# Patient Record
Sex: FEMALE | Race: WHITE
[De-identification: names, ages, dates, MRNs, and addresses within clinical notes are randomized per-mention and may not be internally consistent; named-entity substitution may affect disease eponyms.]

---

## 2018-05-13 ENCOUNTER — HOSPITAL ENCOUNTER (EMERGENCY)
Dept: HOSPITAL 11 - JP.ED | Age: 33
LOS: 1 days | Discharge: HOME | End: 2018-05-14
Payer: MEDICAID

## 2018-05-13 VITALS — DIASTOLIC BLOOD PRESSURE: 75 MMHG | SYSTOLIC BLOOD PRESSURE: 123 MMHG

## 2018-05-13 DIAGNOSIS — Z88.8: ICD-10-CM

## 2018-05-13 DIAGNOSIS — Z87.442: ICD-10-CM

## 2018-05-13 DIAGNOSIS — R10.9: Primary | ICD-10-CM

## 2018-05-13 DIAGNOSIS — Z79.84: ICD-10-CM

## 2018-05-13 DIAGNOSIS — Z88.2: ICD-10-CM

## 2018-05-13 DIAGNOSIS — Z79.899: ICD-10-CM

## 2018-05-13 NOTE — EDM.PDOC
ED HPI GENERAL MEDICAL PROBLEM





- General


Chief Complaint: Flank Pain


Stated Complaint: RIGHT LOWER BACK PAIN


Time Seen by Provider: 18 22:35


Source of Information: Reports: Patient, Old Records, RN


History Limitations: Reports: No Limitations





- History of Present Illness


INITIAL COMMENTS - FREE TEXT/NARRATIVE: 





33 yo pregnant female presents with recent onset of R flank pain that waxes and 

wanes associated with nausea but no vomiting. No fever. She called her OB 

doctor on Friday and cephalexin was prescribed without benefit. She came to the 

OB floor tonight and was assured this was not labor and had a UA that was 

normal. OB referred her to the ER for further eval/treatment. Is taking 

acetaminophen with partial relief. 


Onset: Gradual


Onset Date: 18


Duration: Waxing/Waning


Location: Reports: Back (R flank)


Quality: Reports: Ache


Severity: Moderate


Improves with: Reports: Medication


Worsens with: Reports: None


Context: Reports: Other (Is pregnant with a pHx of kidney stones x 1)


Associated Symptoms: Reports: Nausea/Vomiting (no vomiting), Other (voiding 

more frequently).  Denies: Fever/Chills


Treatments PTA: Reports: Acetaminophen





- Related Data


 Allergies











Allergy/AdvReac Type Severity Reaction Status Date / Time


 


prednisone Allergy  Hives Verified 16 18:14


 


Sulfa (Sulfonamide Allergy  Hives Verified 16 18:14





Antibiotics)     











Home Meds: 


 Home Meds





Cholecalciferol (Vitamin D3) [Cholecalciferol] 1 tab PO DAILY 16 [History]


metFORMIN [Glucophage XR] 1,000 mg PO BID 16 [History]


Cephalexin [Keflex] 500 mg PO TID 18 [History]


[id number]/Iron Fumarate/FA/DSS [Prenatal 19 Tablet] 1 each PO DAILY 18 [

History]











Past Medical History


Genitourinary History: Reports: Renal Calculus, UTI, Recurrent


OB/GYN History: Reports: Pregnancy


Other OB/BYN History:  @ 21wks  [facility] 





Social & Family History





- Tobacco Use


Smoking Status *Q: Never Smoker





ED ROS GENERAL





- Review of Systems


Review Of Systems: See Below


Constitutional: Reports: No Symptoms


HEENT: Reports: No Symptoms


Respiratory: Reports: No Symptoms


Cardiovascular: Reports: No Symptoms


GI/Abdominal: Reports: Nausea.  Denies: Abdominal Pain, Constipation, Diarrhea, 

Vomiting


: Reports: Flank Pain (right), Frequency.  Denies: Dysuria, Hematuria, 

Incontinence


Musculoskeletal: Reports: No Symptoms


Skin: Reports: No Symptoms


Neurological: Reports: No Symptoms





ED EXAM, RENAL/





- Physical Exam


Exam: See Below


Exam Limited By: No Limitations


General Appearance: Alert, WD/WN, No Apparent Distress


Eye Exam: Bilateral Eye: Normal Inspection


Ears: Normal External Exam, Normal Canal, Hearing Grossly Normal


Nose: Normal Inspection, Normal Mucosa, No Blood


Throat/Mouth: Normal Inspection


Head: Atraumatic, Normocephalic


Neck: Normal Inspection


Respiratory/Chest: No Respiratory Distress, Lungs Clear, Normal Breath Sounds, 

No Accessory Muscle Use


Cardiovascular: Regular Rate, Rhythm, No Edema


GI/Abdominal: Soft, Non-Tender, Other (gravid)


Back Exam: Normal Inspection.  No: CVA Tenderness (R), CVA Tenderness (L)


Extremities: Normal Inspection, Normal Range of Motion, Non-Tender, No Pedal 

Edema


Neurological: Alert, Oriented, CN II-XII Intact, Normal Cognition, No Motor/

Sensory Deficits


Psychiatric: Normal Affect, Normal Mood


Skin Exam: Warm, Dry, Intact, Normal Color, No Rash





Course





- Vital Signs


Last Recorded V/S: 


 Last Vital Signs











Temp  37.3 C   18 22:24


 


Pulse  89   18 22:24


 


Resp  18   18 22:24


 


BP  123/75   18 22:24


 


Pulse Ox  99   18 22:24














- Orders/Labs/Meds


Orders: 


 Active Orders 24 hr











 Category Date Time Status


 


 Renal Comp [US] Stat Exams  18 22:31 Taken














- Radiology Interpretation


Free Text/Narrative:: 





Renal ultrasound-suspicious for R ureteral obstruction





Departure





- Departure


Time of Disposition: 00:20


Disposition: Home, Self-Care 01


Condition: Fair


Clinical Impression: 


 Right flank pain





Clinical Impression: 


 (Ruled Out): Flank pain





- Discharge Information


Referrals: 


Benita Coyle MD [Primary Care Provider] - 


Forms:  ED Department Discharge





- My Orders


Last 24 Hours: 


My Active Orders





18 22:31


Renal Comp [US] Stat 














- Assessment/Plan


Last 24 Hours: 


My Active Orders





18 22:31


Renal Comp [US] Stat

## 2019-04-10 ENCOUNTER — HOSPITAL ENCOUNTER (OUTPATIENT)
Dept: HOSPITAL 11 - JP.US | Age: 34
End: 2019-04-10
Attending: FAMILY MEDICINE
Payer: MEDICAID

## 2019-04-10 DIAGNOSIS — N61.0: Primary | ICD-10-CM

## 2019-04-10 NOTE — US
Breast Limited Rt

 

CLINICAL HISTORY: Right breast mastitis, nursing

 

FINDINGS: Real-time images were obtained through the upper outer quadrant of the

right breast were patient had episode of the pain and swelling. Patient is

currently asymptomatic. No mass is identified. There is mildly prominent duct in

the subareolar region likely related to lactation.

 

IMPRESSION: No mass, cyst or architectural distortion

 

Recommendations: Clinical follow-up, no specific imaging recommended

 

BI-RADS 2: Benign findings.